# Patient Record
(demographics unavailable — no encounter records)

---

## 2024-12-20 NOTE — HISTORY OF PRESENT ILLNESS
[FreeTextEntry1] : Ms. Mini Clifton is a very pleasant 42 year old female with history of diabetes, high cholesterol, gastric sleeve surgery who presents for follow up evaluation of neck pain, low back pain, leg pain, and right arm numbness.  She was last seen at the end of July and at that time had had some improvement in her symptoms with physical therapy and so wanted to follow-up with me on an as-needed basis.  Since that visit, she has been continuing to see pain management and was able to finally get her MRIs approved and is here to review the results today.  She reports that she continues to have neck pain that is rated 5 out of 10.  Her neck is always stiff.  The pain shoots into her right arm on the outside of her arm into her 3 fingers.  She also has constant hand numbness.  Overall her symptoms are largely the same.  She also has low back pain that is rated 5 out of 10.  She is set to undergo an injection with Dr. Richard next Monday.  She has been taking ibuprofen and meloxicam as needed.  No changes with balance or difficulty using her hands.  Has been doing exercises on her own but was discharged from physical therapy.  Lives at home with kids and .

## 2024-12-20 NOTE — ASSESSMENT
[FreeTextEntry1] : Ms. Mini Clifton is a very pleasant 42 year old female with history of diabetes, high cholesterol, gastric sleeve surgery who presents for follow up evaluation of neck pain, low back pain, leg pain, and right arm numbness.  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  I discussed with her her treatment options which include continued conservative management with physical therapy, injections versus surgery in the form of a C4-C5 and C5-C6 ACDF.  Given how young she is as well as the fact that her symptoms have overall been stable, my recommendation would be for continued conservative management.  She is in agreement as she would like to avoid surgery at all costs.  I have let Dr. Richard know that she is safe for C6/C7 and C7-T1 PILLO should she need.  I also talked with her though that if she were to need surgery, I would like to get a CT scan to ensure that her discs are not calcified as this would increase the risk of an anterior approach to the spine.  She should continue physical therapy as she had some improvement with formal PT.  I will check in on her in about 6 weeks. All questions answered.  She knows to call my office with any issues or concerns.  Plan: - CT Cervical Spine Non Con - Referral to physical therapy provided  - Follow up in 6-8 weeks for symptom evaluation